# Patient Record
(demographics unavailable — no encounter records)

---

## 2025-06-11 NOTE — HISTORY OF PRESENT ILLNESS
[FreeTextEntry1] : 30yo female presents with secondary amenorrhea. Pt with positive pregnancy test at home. LMP: 4/17/25 RAVINDER: 1/22/26 GA today: 7w6d Ucg: positive  Office US: CRL 8w3d, +FCA 166bpm   Plan: - H/o c-sec and plans to TOLAC - NIPTS with the office as she does not want a genetics consult - Reviewed PNV, diet & exercise, course of pregnancy, all questions answered. - NT and Genetics consults ordered 45min spent excluding sono RTO 4-6 wks ISAC Vu MD

## 2025-07-13 NOTE — HISTORY OF PRESENT ILLNESS
[FreeTextEntry1] : HPI: Patient is a 28yo female presenting for her New OB visit. LMP: 25 RAVINDER: 26 Gestational Age today: 11w6d Patient is without complaints today. Denies pain or VB.   Office US: +FCA, 168bpm   OB Hx:  C-sec for failure to progress, 40wks, 8lb 6oz   Gyn Hx: No known fibroids, ovarian cysts, or other gynecologic problems Pap:  NILM with neg hr hpv   PMH: denies PSH: c-sec x 1 Meds: PNV, ASA All: NKDA SH: neg x 3   Additional Exam:   Neck: no palpable goiter   Gyn: Normal appearing external genitalia, normal appearing vagina and cervix, no CMT   Breast: No lymphadenopathy in the neck, chest wall, bilateral supraclavicular, infraclavicular, and bilateral axillary areas. No overt asymmetry in bilateral breast contour with normal appearing skin and normal appearing nipple areolar complex bilaterally. No nipple discharge expressed.   Plan: Routine PNC [x] ACOG labs - T&S, CBC, hgb electrophoresis, rubella, rubeola, varicella, UA/UCx, pap, Lead, HIV/Hep B&C/RPR/GC/CT [ ] Influenza vaccine [ ] Covid vaccine and booster recommendations reviewed with the patient [x] Expanded Carrier Screening - genetics consult given [x] NT sono / Seq 1 scheduled for  [x] NIPT sent today [x] Nursing interview / packet / [place] scanned [ ] Seq 2  [ ] Anatomy sono  [ ] 1hr GCT / CBC / syphilis [ ] Tdap vaccine  [ ] RSV vaccine [ ] GBS/HIV/CBC/Syphilis   Obesity - BMI is 31 - Early 1hr GCT today - ASA at 12 weeks - Nutrition Consult - Reviewed risks of obesity related DM and HTN in pregnancy along with risks for  and postterm births and labor complications - Serial growth US - Antepartum testing   H/o  - considering TOLAC vs RCS  RTO 4wks ISAC Vu MD

## 2025-07-13 NOTE — HISTORY OF PRESENT ILLNESS
[FreeTextEntry1] : HPI: Patient is a 30yo female presenting for her New OB visit. LMP: 25 RAVINDER: 26 Gestational Age today: 11w6d Patient is without complaints today. Denies pain or VB.   Office US: +FCA, 168bpm   OB Hx:  C-sec for failure to progress, 40wks, 8lb 6oz   Gyn Hx: No known fibroids, ovarian cysts, or other gynecologic problems Pap:  NILM with neg hr hpv   PMH: denies PSH: c-sec x 1 Meds: PNV, ASA All: NKDA SH: neg x 3   Additional Exam:   Neck: no palpable goiter   Gyn: Normal appearing external genitalia, normal appearing vagina and cervix, no CMT   Breast: No lymphadenopathy in the neck, chest wall, bilateral supraclavicular, infraclavicular, and bilateral axillary areas. No overt asymmetry in bilateral breast contour with normal appearing skin and normal appearing nipple areolar complex bilaterally. No nipple discharge expressed.   Plan: Routine PNC [x] ACOG labs - T&S, CBC, hgb electrophoresis, rubella, rubeola, varicella, UA/UCx, pap, Lead, HIV/Hep B&C/RPR/GC/CT [ ] Influenza vaccine [ ] Covid vaccine and booster recommendations reviewed with the patient [x] Expanded Carrier Screening - genetics consult given [x] NT sono / Seq 1 scheduled for  [x] NIPT sent today [x] Nursing interview / packet / [place] scanned [ ] Seq 2  [ ] Anatomy sono  [ ] 1hr GCT / CBC / syphilis [ ] Tdap vaccine  [ ] RSV vaccine [ ] GBS/HIV/CBC/Syphilis   Obesity - BMI is 31 - Early 1hr GCT today - ASA at 12 weeks - Nutrition Consult - Reviewed risks of obesity related DM and HTN in pregnancy along with risks for  and postterm births and labor complications - Serial growth US - Antepartum testing   H/o  - considering TOLAC vs RCS  RTO 4wks ISAC Vu MD